# Patient Record
Sex: MALE | Race: WHITE | URBAN - METROPOLITAN AREA
[De-identification: names, ages, dates, MRNs, and addresses within clinical notes are randomized per-mention and may not be internally consistent; named-entity substitution may affect disease eponyms.]

---

## 2017-03-31 DIAGNOSIS — R06.81 WITNESSED APNEIC SPELLS: ICD-10-CM

## 2017-03-31 DIAGNOSIS — R06.83 SNORING: ICD-10-CM

## 2017-03-31 DIAGNOSIS — G47.8 NON-RESTORATIVE SLEEP: ICD-10-CM

## 2017-03-31 DIAGNOSIS — R35.1 NOCTURIA: ICD-10-CM

## 2017-03-31 RX ORDER — CLONAZEPAM 0.5 MG/1
0.5 TABLET ORAL 2 TIMES DAILY PRN
Qty: 60 TABLET | Refills: 0 | Status: CANCELLED | OUTPATIENT
Start: 2017-03-31

## 2017-03-31 NOTE — TELEPHONE ENCOUNTER
New patient to Dr. Kennedy in August 2016  This is what Dr. Kennedy had written in his plan:   F41.9) Anxiety  With stuttering and panic attacks. Doing well he has work hard with speech therapy and stutter is minimal. I am okay with refill of his klonipin. He jerman angela to come in In 3 months.    Patient will need to schedule a visit with Dr. Kennedy for refills    Maddie

## 2017-03-31 NOTE — TELEPHONE ENCOUNTER
Ethan is calling for his clonazepam.  Out of refills.  Can you please review and fill in Dr. Kennedy's absence.  Thank you..Renetta Hankins      clonazepam      Last Written Prescription Date:  10/17/16  Last Fill Quantity: 60,   # refills: 0  Last Office Visit with [facility], [facility] or [facility] prescribing provider: 8/17/16  Future Office visit:       Routing refill request to provider for review/approval because:  Drug not on the [facility], [facility] or [facility] refill protocol or controlled substance

## 2017-04-17 DIAGNOSIS — R06.83 SNORING: ICD-10-CM

## 2017-04-17 DIAGNOSIS — R35.1 NOCTURIA: ICD-10-CM

## 2017-04-17 DIAGNOSIS — G47.8 NON-RESTORATIVE SLEEP: ICD-10-CM

## 2017-04-17 DIAGNOSIS — R06.81 WITNESSED APNEIC SPELLS: ICD-10-CM

## 2017-04-17 RX ORDER — CLONAZEPAM 0.5 MG/1
0.25 TABLET ORAL 2 TIMES DAILY PRN
Qty: 56 TABLET | Refills: 0 | Status: SHIPPED | OUTPATIENT
Start: 2017-04-17 | End: 2017-04-17

## 2017-04-17 RX ORDER — CLONAZEPAM 0.5 MG/1
0.25 TABLET ORAL 2 TIMES DAILY PRN
Qty: 12 TABLET | Refills: 0 | Status: SHIPPED | OUTPATIENT
Start: 2017-04-17 | End: 2017-04-20

## 2017-04-17 NOTE — TELEPHONE ENCOUNTER
Ethan is requesting his clonazepam. I recommended that he make an appointment to see Dr. Kennedy, but he stated that he was using up all his refills before he makes an appointment.  (?).    clonazepam      Last Written Prescription Date:  10/17/16  Last Fill Quantity: 60,   # refills: 0  Last Office Visit with [facility] or [facility] prescribing provider: 8/17/16  Future Office visit:       Routing refill request to provider for review/approval because:  Drug not on the [facility] or [facility] refill protocol or controlled substance

## 2017-04-20 ENCOUNTER — OFFICE VISIT (OUTPATIENT)
Dept: FAMILY MEDICINE | Facility: CLINIC | Age: 32
End: 2017-04-20
Payer: COMMERCIAL

## 2017-04-20 VITALS
BODY MASS INDEX: 27.3 KG/M2 | HEART RATE: 55 BPM | HEIGHT: 71 IN | SYSTOLIC BLOOD PRESSURE: 123 MMHG | DIASTOLIC BLOOD PRESSURE: 83 MMHG | TEMPERATURE: 98.2 F | WEIGHT: 195 LBS

## 2017-04-20 DIAGNOSIS — G47.8 NON-RESTORATIVE SLEEP: ICD-10-CM

## 2017-04-20 DIAGNOSIS — G47.33 OSA (OBSTRUCTIVE SLEEP APNEA): ICD-10-CM

## 2017-04-20 DIAGNOSIS — R06.81 WITNESSED APNEIC SPELLS: ICD-10-CM

## 2017-04-20 DIAGNOSIS — R06.83 SNORING: ICD-10-CM

## 2017-04-20 DIAGNOSIS — R35.1 NOCTURIA: ICD-10-CM

## 2017-04-20 DIAGNOSIS — F41.9 ANXIETY: Primary | ICD-10-CM

## 2017-04-20 PROCEDURE — 99214 OFFICE O/P EST MOD 30 MIN: CPT | Performed by: FAMILY MEDICINE

## 2017-04-20 RX ORDER — CLONAZEPAM 0.5 MG/1
0.5 TABLET ORAL 2 TIMES DAILY PRN
Qty: 60 TABLET | Refills: 0 | Status: SHIPPED | OUTPATIENT
Start: 2017-04-20 | End: 2017-09-18

## 2017-04-20 RX ORDER — CLONAZEPAM 0.5 MG/1
0.5 TABLET ORAL 2 TIMES DAILY PRN
Qty: 60 TABLET | Refills: 0 | Status: SHIPPED | OUTPATIENT
Start: 2017-05-20

## 2017-04-20 RX ORDER — CLONAZEPAM 0.5 MG/1
0.25 TABLET ORAL 2 TIMES DAILY PRN
Qty: 12 TABLET | Refills: 0 | Status: SHIPPED | OUTPATIENT
Start: 2017-04-20

## 2017-04-20 RX ORDER — CLONAZEPAM 0.5 MG/1
0.5 TABLET ORAL 2 TIMES DAILY PRN
Qty: 60 TABLET | Refills: 0 | Status: SHIPPED | OUTPATIENT
Start: 2017-04-20

## 2017-04-20 ASSESSMENT — ANXIETY QUESTIONNAIRES
7. FEELING AFRAID AS IF SOMETHING AWFUL MIGHT HAPPEN: NOT AT ALL
6. BECOMING EASILY ANNOYED OR IRRITABLE: SEVERAL DAYS
GAD7 TOTAL SCORE: 3
1. FEELING NERVOUS, ANXIOUS, OR ON EDGE: SEVERAL DAYS
3. WORRYING TOO MUCH ABOUT DIFFERENT THINGS: NOT AT ALL
2. NOT BEING ABLE TO STOP OR CONTROL WORRYING: NOT AT ALL
5. BEING SO RESTLESS THAT IT IS HARD TO SIT STILL: NOT AT ALL

## 2017-04-20 ASSESSMENT — PATIENT HEALTH QUESTIONNAIRE - PHQ9: 5. POOR APPETITE OR OVEREATING: SEVERAL DAYS

## 2017-04-20 NOTE — NURSING NOTE
"Chief Complaint   Patient presents with     Anxiety     follow up and refills       Initial /83 (BP Location: Right arm, Cuff Size: Adult Large)  Pulse 55  Temp 98.2  F (36.8  C) (Tympanic)  Ht 5' 10.75\" (1.797 m)  Wt 195 lb (88.5 kg)  BMI 27.39 kg/m2 Estimated body mass index is 27.39 kg/(m^2) as calculated from the following:    Height as of this encounter: 5' 10.75\" (1.797 m).    Weight as of this encounter: 195 lb (88.5 kg).  Medication Reconciliation: complete   Sarah Noriega / Certified Medical Assistant......4/20/2017 10:10 AM    "

## 2017-04-20 NOTE — PROGRESS NOTES
"  SUBJECTIVE:                                                    Ethan Benítez is a 31 year old male who presents to clinic today for the following health issues:      Anxiety Follow-Up    Status since last visit: No change    Other associated symptoms:None    Complicating factors:   Significant life event: No   Current substance abuse: None  Depression symptoms: No  No flowsheet data found.     GAD7       Amount of exercise or physical activity: 4-5 days/week for an average of greater than 60 minutes    Problems taking medications regularly: No    Medication side effects: none    Diet: regular (no restrictions)    Problem list and histories reviewed & adjusted, as indicated.  Additional history: as documented    Patient Active Problem List   Diagnosis     VANESA (obstructive sleep apnea)-moderate (AHI 18)     Anxiety     Past Surgical History:   Procedure Laterality Date     HC TOOTH EXTRACTION W/FORCEP         Social History   Substance Use Topics     Smoking status: Never Smoker     Smokeless tobacco: Not on file     Alcohol use No     Family History   Problem Relation Age of Onset     Psychotic Disorder Father      Completed suicide 14 years ago     Neurologic Disorder Mother      Migraines, alive age 60           Reviewed and updated as needed this visit by clinical staff  Tobacco  Allergies  Meds  Med Hx  Surg Hx  Fam Hx  Soc Hx      Reviewed and updated as needed this visit by Provider         ROS:  Constitutional, HEENT, cardiovascular, pulmonary, gi and gu systems are negative, except as otherwise noted.    OBJECTIVE:                                                    /83 (BP Location: Right arm, Cuff Size: Adult Large)  Pulse 55  Temp 98.2  F (36.8  C) (Tympanic)  Ht 5' 10.75\" (1.797 m)  Wt 195 lb (88.5 kg)  BMI 27.39 kg/m2  Body mass index is 27.39 kg/(m^2).  GENERAL: healthy, alert and no distress  NECK: no adenopathy, no asymmetry, masses, or scars and thyroid normal to palpation  RESP: " lungs clear to auscultation - no rales, rhonchi or wheezes  CV: regular rate and rhythm, normal S1 S2, no S3 or S4, no murmur, click or rub, no peripheral edema and peripheral pulses strong  ABDOMEN: soft, nontender, no hepatosplenomegaly, no masses and bowel sounds normal  MS: no gross musculoskeletal defects noted, no edema    Diagnostic Test Results:  none      ASSESSMENT/PLAN:                                                      (F41.9) Anxiety  (primary encounter diagnosis)  Comment: stable  Okay to use klonopin rrarely as long as he is using his cpap  Plan: clonazePAM (KLONOPIN) 0.5 MG tablet, clonazePAM        (KLONOPIN) 0.5 MG tablet, clonazePAM (KLONOPIN)        0.5 MG tablet, clonazePAM (KLONOPIN) 0.5 MG         tablet  Self relaxation for inspomnia reviewed  (R06.83) Snoring  Comment:   Plan: much improved sincu using cpap    (R06.81) Witnessed apneic spells   no others reported since sarting cpap    (G47.8) Non-restorative sleep  improving    (R35.1) Nocturia   improved but still present                Sp Kennedy MD  [facility]

## 2017-04-21 ASSESSMENT — ANXIETY QUESTIONNAIRES: GAD7 TOTAL SCORE: 3

## 2017-04-21 ASSESSMENT — PATIENT HEALTH QUESTIONNAIRE - PHQ9: SUM OF ALL RESPONSES TO PHQ QUESTIONS 1-9: 5

## 2017-09-18 DIAGNOSIS — F41.9 ANXIETY: ICD-10-CM

## 2017-09-18 RX ORDER — CLONAZEPAM 0.5 MG/1
0.5 TABLET ORAL 2 TIMES DAILY PRN
Qty: 60 TABLET | Refills: 0 | Status: SHIPPED | OUTPATIENT
Start: 2017-09-18 | End: 2017-10-23

## 2017-09-18 NOTE — TELEPHONE ENCOUNTER
clonazePAM (KLONOPIN) 0.5 MG tablet      Last Written Prescription Date:  4/20/17  Last Fill Quantity: 60,   # refills: 0  Last Office Visit with [facility], [facility] or [facility] prescribing provider: 4/20/17  Future Office visit:       Routing refill request to provider for review/approval because:  Drug not on the [facility], [facility] or [facility] refill protocol or controlled substance

## 2017-10-23 DIAGNOSIS — F41.9 ANXIETY: ICD-10-CM

## 2017-10-23 RX ORDER — CLONAZEPAM 0.5 MG/1
0.5 TABLET ORAL 2 TIMES DAILY PRN
Qty: 60 TABLET | Refills: 0 | Status: CANCELLED | OUTPATIENT
Start: 2017-10-23

## 2017-10-23 NOTE — TELEPHONE ENCOUNTER
Denied.      My expectation is he needs to be sen every 3 months. If he wants to continue on this medication

## 2017-10-23 NOTE — TELEPHONE ENCOUNTER
clonazePAM (KLONOPIN) 0.5 MG tablet      Last Written Prescription Date:  9/18/17  Last Fill Quantity: 60,   # refills: 0  Future Office visit:       Routing refill request to provider for review/approval because:  Drug not on the [facility] or [facility] refill protocol or controlled substance

## 2017-10-23 NOTE — TELEPHONE ENCOUNTER
Message left on [phone] advising him of need for appointment at least every 3 months if he wants to continue taking the requested medication. Left him [phone] number to call if he would like to schedule.  Fransisco Quinones RN